# Patient Record
Sex: MALE | Race: WHITE
[De-identification: names, ages, dates, MRNs, and addresses within clinical notes are randomized per-mention and may not be internally consistent; named-entity substitution may affect disease eponyms.]

---

## 2022-01-02 ENCOUNTER — HOSPITAL ENCOUNTER (EMERGENCY)
Dept: HOSPITAL 47 - EC | Age: 37
Discharge: HOME | End: 2022-01-02
Payer: COMMERCIAL

## 2022-01-02 VITALS — RESPIRATION RATE: 18 BRPM

## 2022-01-02 VITALS — SYSTOLIC BLOOD PRESSURE: 125 MMHG | HEART RATE: 104 BPM | DIASTOLIC BLOOD PRESSURE: 74 MMHG | TEMPERATURE: 97.7 F

## 2022-01-02 DIAGNOSIS — U07.1: Primary | ICD-10-CM

## 2022-01-02 DIAGNOSIS — Z79.899: ICD-10-CM

## 2022-01-02 DIAGNOSIS — F17.290: ICD-10-CM

## 2022-01-02 DIAGNOSIS — E11.9: ICD-10-CM

## 2022-01-02 PROCEDURE — 87635 SARS-COV-2 COVID-19 AMP PRB: CPT

## 2022-01-02 PROCEDURE — 99283 EMERGENCY DEPT VISIT LOW MDM: CPT

## 2022-01-02 NOTE — ED
General Adult HPI





- General


Chief complaint: Recheck/Abnormal Lab/Rx


Stated complaint: covid +/bam


Time Seen by Provider: 01/02/22 16:25


Source: patient, RN notes reviewed, old records reviewed


Mode of arrival: ambulatory


Limitations: no limitations





- History of Present Illness


Initial comments: 





Patient is a 36-year-old male with past medical history remarkable for diet-

controlled diabetes, obesity who presents emergency Department concerned for 

being Covid positive.  He used a home test this morning and tested positive.  He

has had symptoms for the last 2 days.  He was vaccinated for Covid but has not 

yet received his booster.  Complaining of just mild upper respiratory symptoms 

including mild nonproductive cough, nasal congestion, and joint pain.  His no 

other acute complaint at this time.  Presents seeking medical antibody therapy. 

I evaluated the patient when he was placed in a room.





- Related Data


                                Home Medications











 Medication  Instructions  Recorded  Confirmed


 


Ascorbic Acid [Vitamin C] 500 mg PO DAILY 01/02/22 01/02/22








                                  Previous Rx's











 Medication  Instructions  Recorded


 


Albuterol Inhaler [Ventolin Hfa 1 puff INHALATION RT-QID #8 gm 01/02/22





Inhaler]  











                                    Allergies











Allergy/AdvReac Type Severity Reaction Status Date / Time


 


No Known Allergies Allergy   Verified 01/02/22 16:54














Review of Systems


ROS Statement: 


Those systems with pertinent positive or pertinent negative responses have been 

documented in the HPI.


Review of Systems:


CONST: Denies fever 


EYES: Denies blurry vision 


ENT: Endorses nasal congestion


C/V:  Denies Chest pain


RESP: Denies shortness of breath 


GI: Denies abdominal pain 


: Denies dysuria  


SKIN: Denies rash.


MSK: Endorses joint pain.


NEURO: Denies headache 


ROS Other: All systems not noted in ROS Statement are negative.





Past Medical History


Past Medical History: Diabetes Mellitus


History of Any Multi-Drug Resistant Organisms: None Reported


Past Surgical History: No Surgical Hx Reported


Past Psychological History: No Psychological Hx Reported


Smoking Status: Vaper


Past Alcohol Use History: None Reported


Past Drug Use History: None Reported





General Exam





- General Exam Comments


Initial Comments: 





General: Appears in no acute distress.


HEAD:  Normal with no signs of head trauma.


EYES:  PERRLA, EOMI, conjunctiva normal, no discharge.


ENT:  Hearing grossly intact, normal oropharynx.


RESPIRATORY:  Clear breath sounds bilaterally.  No wheezes, rales, or rhonchi.  

Not hypoxic.  No increased work of breathing.


C/V: Regular rate and rhythm S1 and S2 auscultated.  No peripheral edema.  

Peripheral pulses 2+ and intact throughout.


ABD:  Abd is soft, nontender, nondistended


EXT: Normal range of motion, no obvious deformity


SKIN:  No rashes or lesions observed on exposed skin.


NEURO: Alert and oriented 4.  No focal deficits.


Limitations: no limitations





Course


                                   Vital Signs











  01/02/22 01/02/22 01/02/22





  13:43 17:36 19:33


 


Temperature 98.1 F 98.1 F 97.7 F


 


Pulse Rate 112 H 101 H 104 H


 


Respiratory 18 18 18





Rate   


 


Blood Pressure 133/90 146/92 125/74


 


O2 Sat by Pulse 97 97 98





Oximetry   














Medical Decision Making





- Medical Decision Making





Based on the patient's presentation and physical exam, he likely is Covid 19.  

He is vaccinated.  He has no other acute complaints at this time.  We will 

obtain a COVID-19 swab.  He does meet criteria based on BMI for monoclonal 

antibody therapy.  COVID-19 swab was positive.  He consented to monoclonal 

antibody therapy.  Patient will be discharged home after monoclonal by therapy. 

I do not believe that he requires any further laboratory studies or imaging at 

this time.  He is in no respiratory distress.  He is not hypoxic.





Patient tolerated monoclonal antibody therapy.  He will be discharged home.  I 

discussed quarantining with him.  He will be given an albuterol inhaler.  I also

recommended that he obtain a pulse oximeter for further monitoring.  He was in 

agreement with the plan.





I will provide the patient with a prescription for albuterol inhaler. I 

instructed the patient to follow up with their PCP in the next 3 days.  .  I 

explained that the patient should return to the emergency department if they 

experience any worsening symptoms. Strict return precautions were discussed with

the patient. The patient expressed understanding of these instructions. I 

answered all questions that the patient had. The patient was discharged home in 

good condition with their prescriptions and follow up information.





- Lab Data


                                   Lab Results











  01/02/22 Range/Units





  16:33 


 


Coronavirus (PCR)  Detected A  (Not Detectd)  














Disposition


Clinical Impression: 


 COVID-19 virus infection





Disposition: HOME SELF-CARE


Condition: Good


Instructions (If sedation given, give patient instructions):  Coronavirus 

Disease 2019 (COVID-19)


Prescriptions: 


Albuterol Inhaler [Ventolin Hfa Inhaler] 1 puff INHALATION RT-QID #8 gm


Is patient prescribed a controlled substance at d/c from ED?: No


Referrals: 


Dhruv Ojeda MD [Primary Care Provider] - 1-2 days